# Patient Record
Sex: MALE | Race: WHITE
[De-identification: names, ages, dates, MRNs, and addresses within clinical notes are randomized per-mention and may not be internally consistent; named-entity substitution may affect disease eponyms.]

---

## 2019-06-03 ENCOUNTER — HOSPITAL ENCOUNTER (OUTPATIENT)
Dept: HOSPITAL 41 - JD.SDS | Age: 34
Discharge: HOME | End: 2019-06-03
Attending: SURGERY
Payer: COMMERCIAL

## 2019-06-03 VITALS — DIASTOLIC BLOOD PRESSURE: 87 MMHG | SYSTOLIC BLOOD PRESSURE: 125 MMHG

## 2019-06-03 DIAGNOSIS — Z88.1: ICD-10-CM

## 2019-06-03 DIAGNOSIS — G43.909: ICD-10-CM

## 2019-06-03 DIAGNOSIS — K92.1: Primary | ICD-10-CM

## 2019-06-03 DIAGNOSIS — D64.9: ICD-10-CM

## 2019-06-03 DIAGNOSIS — K64.9: ICD-10-CM

## 2019-06-03 DIAGNOSIS — K21.9: ICD-10-CM

## 2019-06-03 PROCEDURE — 45378 DIAGNOSTIC COLONOSCOPY: CPT

## 2019-06-03 PROCEDURE — 43235 EGD DIAGNOSTIC BRUSH WASH: CPT

## 2019-06-03 NOTE — PCM.PREANE
Preanesthetic Assessment





- Anesthesia/Transfusion/Family Hx


Anesthesia History: Prior Anesthesia Without Reaction


Family History of Anesthesia Reaction: No


Transfusion History: No Prior Transfusion(s)


Intubation History: Unknown





- Review of Systems


General: No Symptoms


Pulmonary: No Symptoms


Cardiovascular: No Symptoms (elevated BP on no medications), Lightheadedness (

positional changes)


Gastrointestinal: No Symptoms (History of gerd)


Neurological: No Symptoms (History of vertigo and motion sickness), Headache (

History of migraines)


Other: Reports: None (Anemia: Hgb= 10.9), Sinus Problem (History of sinus 

infections)





- Physical Assessment


NPO Status Date: 06/02/19


NPO Status Time: 23:30


Pulse: 88


O2 Sat by Pulse Oximetry: 97


Respiratory Rate: 16


Blood Pressure: 132/84


Temperature: 36.3 C


Height: 1.91 m


Weight: 160.572 kg


ASA Class: 2


Mental Status: Alert & Oriented x3


Airway Class: Mallampati = 2


Dentition: Reports: Normal Dentition, Caries


Thyro-Mental Finger Breadths: 3


Mouth Opening Finger Breadths: 3


ROM/Head Extension: Full


Lungs: Clear to Auscultation, Normal Respiratory Effort


Cardiovascular: Regular Rate, Regular Rhythm, No Murmurs





- Lab


Values: 





Labs reviewed and noted and within acceptable ranges to proceed with scheduled 

procedure.





- Allergies


Allergies/Adverse Reactions: 


 Allergies











Allergy/AdvReac Type Severity Reaction Status Date / Time


 


cefaclor [From Ceclor] Allergy  Hives Verified 05/31/19 14:02


 


c-clor Allergy  Hives Uncoded 05/31/19 14:02














- Anesthesia Plan


Pre-Op Medication Ordered: None





- Acknowledgements


Anesthesia Type Planned: MAC


Pt an Appropriate Candidate for the Planned Anesthesia: Yes


Alternatives and Risks of Anesthesia Discussed w Pt/Guardian: Yes


Pt/Guardian Understands and Agrees with Anesthesia Plan: Yes





PreAnesthesia Questionnaire





- Past Health History


Medical/Surgical History: Denies Medical/Surgical History


HEENT History: Reports: None


Cardiovascular History: Reports: None


Respiratory History: Reports: None


Gastrointestinal History: Reports: None


Genitourinary History: Reports: None


OB/GYN History: Reports: None


Musculoskeletal History: Reports: None


Neurological History: Reports: Migraines


Psychiatric History: Reports: None


Endocrine/Metabolic History: Reports: None


Hematologic History: Reports: None


Immunologic History: Reports: None


Oncologic (Cancer) History: Reports: None


Dermatologic History: Reports: Other (See Below)


Other Dermatologic History: skin tag removal





- Past Surgical History


Head Surgeries/Procedures: Reports: None


HEENT Surgical History: Reports: Myringotomy w Tube(s)


Cardiovascular Surgical History: Reports: None


Respiratory Surgical History: Reports: None


GI Surgical History: Reports: Appendectomy


Female  Surgical History: Reports: None


Male  Surgical History: Reports: None


Endocrine Surgical History: Reports: None


Neurological Surgical History: Reports: None


Musculoskeletal Surgical History: Reports: None


Oncologic Surgical History: Reports: None





- SUBSTANCE USE


Smoking Status *Q: Never Smoker


Recreational Drug Use History: No





- HOME MEDS


Home Medications: 


 Home Meds





Acetaminophen/Caffeine [Excedrin Tension Headache Cplt] 2 tab PO Q6H PRN 05/31/ 19 [History]











- CURRENT (IN HOUSE) MEDS


Current Meds: 





 Current Medications





Lactated Ringer's (Ringers, Lactated)  1,000 mls @ 125 mls/hr IV ASDIRECTED YONI


Lidocaine/Sodium Bicarbonate (Buffered Lidocaine 1% In Ns 8.4%)  0.25 ml IDERM 

ONETIME PRN


   PRN Reason: Prior to IV Start


Sodium Chloride (Saline Flush)  10 ml FLUSH ASDIRECTED PRN


   PRN Reason: Keep Vein Open

## 2019-06-03 NOTE — PCM.OPNOTE
- General Post-Op/Procedure Note


Date of Surgery/Procedure: 06/03/19


Pre Op Diagnosis: anemia and rectal bleeding


Post-Op Diagnosis: Same


Anesthesia Technique: MAC


Primary Surgeon: Omero Velasquez


EBL in mLs: 0


Complications: None


Condition: Good

## 2019-06-04 NOTE — OR
DATE OF OPERATION:  06/03/2019

 

SURGEON:  Omero Velasquez MD

 

PREOPERATIVE DIAGNOSIS:

1. Melena.

2. Rectal bleeding.

 

POSTOPERATIVE DIAGNOSIS:

1. Melena.

2. Rectal bleeding.

 

OPERATION PERFORMED:  Colonoscopy to cecum.

 

FINDINGS:

Mildly inflamed internal hemorrhoids.  There were no angiodysplasias,

neoplasias, large tumor masses, ulcerations, or diverticulum.  Hemorrhoids were

not engorged.

 

ANESTHESIA:

Procedure done under IV sedation.

 

DESCRIPTION OF PROCEDURE:

The patient was taken to the endoscopy room and having been connected to

monitoring equipment and given IV sedation for upper GI endoscopy, this was

continued for colonoscopy.  Placed in the left lateral position.  Perianal area

was unremarkable.  Rectal exam showed good sphincter tone.  A video Olympus

colonoscope was then introduced into the rectum and threaded up without problem

to the cecum, where the cecal anatomy and the ileocecal valve were noted.  Prep

was excellent.  [place] Cleansing Score grade A, and the scope was slowly

withdrawn showing the cecum, ascending colon, transverse colon, descending

colon, sigmoid colon, and rectum.  Retroflexed view was done.  Following this,

an anoscope was inserted, demonstrated mildly mild inflammation of the

hemorrhoids.  The patient tolerated the procedure, sent to recovery room in a

stable condition and will be followed up in the clinic.

 

ESTIMATED BLOOD LOSS:

 

 

DD:  06/03/2019 09:39:10

DT:  06/03/2019 15:48:14  MMODAL

Job #:  [id number]

## 2019-06-04 NOTE — OR
DATE OF OPERATION:  06/03/2019

 

SURGEON:  Omero Velasquez MD

 

PREOPERATIVE DIAGNOSIS:

Anemia.

 

POSTOPERATIVE DIAGNOSIS:

Anemia.

 

OPERATION PERFORMED:  Esophagogastroduodenoscopy.

 

FINDINGS:

A small sliding hiatal hernia.  Some chronic esophagitis with GE junction

located at 42 cm.  The Z-line was sharp.  The second portion of the duodenum,

duodenal bulb, pyloric channel, and antrum, body, cardia, and fundus of the

stomach were unremarkable.  J-maneuver showed some mild incompetence of the

hiatus.  Rest of the esophagus was normal.

 

ANESTHESIA:

Done under IV sedation.

 

DESCRIPTION OF PROCEDURE:

The patient was taken to the endoscopy room, placed in the supine position,

connected to monitoring equipment, given IV sedation, placed in the left lateral

position.  Bite block was inserted and video Olympus gastroscope placed in the

posterior oropharynx under direct vision and threaded past the cricopharyngeus,

down the esophagus, into the stomach.  The stomach was insufflated and the scope

passed through the pylorus and second portion of the duodenum and slowly

withdrawn showing normal second portion of the duodenum, duodenal bulb, and

pyloric channel.  Antrum, body, cardia of the stomach, and fundus were reviewed.

J-maneuver was performed showing the above findings.  Scope withdrawn to the GE

junction.  The above noted was found.  Rest of the esophagus viewed as the scope

withdrawn was unremarkable.  The patient tolerated the procedure.  IV sedation

will be continued for colonoscopy.

 

ESTIMATED BLOOD LOSS:

 

 

DD:  06/03/2019 09:03:19

DT:  06/03/2019 14:44:55  MMODAL

Job #:  [id number]